# Patient Record
Sex: MALE | Race: WHITE | NOT HISPANIC OR LATINO | Employment: OTHER | URBAN - METROPOLITAN AREA
[De-identification: names, ages, dates, MRNs, and addresses within clinical notes are randomized per-mention and may not be internally consistent; named-entity substitution may affect disease eponyms.]

---

## 2017-06-21 ENCOUNTER — GENERIC CONVERSION - ENCOUNTER (OUTPATIENT)
Dept: OTHER | Facility: OTHER | Age: 82
End: 2017-06-21

## 2017-06-22 ENCOUNTER — GENERIC CONVERSION - ENCOUNTER (OUTPATIENT)
Dept: OTHER | Facility: OTHER | Age: 82
End: 2017-06-22

## 2017-06-30 ENCOUNTER — GENERIC CONVERSION - ENCOUNTER (OUTPATIENT)
Dept: OTHER | Facility: OTHER | Age: 82
End: 2017-06-30

## 2017-07-13 ENCOUNTER — GENERIC CONVERSION - ENCOUNTER (OUTPATIENT)
Dept: OTHER | Facility: OTHER | Age: 82
End: 2017-07-13

## 2017-07-31 ENCOUNTER — TRANSCRIBE ORDERS (OUTPATIENT)
Dept: ADMINISTRATIVE | Facility: HOSPITAL | Age: 82
End: 2017-07-31

## 2017-07-31 ENCOUNTER — ALLSCRIPTS OFFICE VISIT (OUTPATIENT)
Dept: OTHER | Facility: OTHER | Age: 82
End: 2017-07-31

## 2017-07-31 DIAGNOSIS — I47.1 PAROXYSMAL SUPRAVENTRICULAR TACHYCARDIA (HCC): ICD-10-CM

## 2017-07-31 DIAGNOSIS — I48.91 ATRIAL FIBRILLATION, UNSPECIFIED TYPE (HCC): Primary | ICD-10-CM

## 2017-07-31 DIAGNOSIS — I42.9 FAMILIAL CARDIOMYOPATHY (HCC): ICD-10-CM

## 2017-09-14 ENCOUNTER — GENERIC CONVERSION - ENCOUNTER (OUTPATIENT)
Dept: OTHER | Facility: OTHER | Age: 82
End: 2017-09-14

## 2018-01-14 NOTE — MISCELLANEOUS
Message  Called pt when checking charts for OV on 9/26 /Testing was cancelled for stress and echo   Lynita Ped  pt states had all testing done at [facility] and wanted to cancel OV to discuss results with Dr Dorita Loja Pt states was going to [address] [place] [address] cancelled for 9/26/17      Active Problems    1  Actinic keratosis (702 0) (L57 0)   2  Aortic valve disorder (424 1) (I35 9)   3  Atrial fibrillation (427 31) (I48 91)   4  Bilateral pleural effusion (511 9) (J90)   5  BPH without urinary obstruction (600 00) (N40 0)   6  Cardiomyopathy (425 4) (I42 9)   7  Chronic kidney disease (585 9) (N18 9)   8  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)   9  Diastolic heart failure (875 66) (I50 30)   10  Disorder of pericardium (423 9) (I31 9)   11  Essential hypertension (401 9) (I10)   12  GERD (gastroesophageal reflux disease) (530 81) (K21 9)   13  Hiatal hernia (553 3) (K44 9)   14  Hyperlipidemia (272 4) (E78 5)   15  Malaise and fatigue (780 79) (R53 81,R53 83)   16  Mitral valve disorder (394 9) (I05 9)   17  Mixed hyperlipidemia (272 2) (E78 2)   18  Paroxysmal SVT (supraventricular tachycardia) (427 0) (I47 1)   19  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)   20  Prostatitis (601 9) (N41 9)   21  Pulmonary heart disease (416 9) (I27 9)    Current Meds   1  Acetaminophen 325 MG Oral Tablet; TAKE 1 TO 2 TABLETS EVERY 4 HOURS AS   NEEDED; Therapy: (Recorded:[id number]) to Recorded   2  Allopurinol 100 MG Oral Tablet; TAKE 1 TABLET TWICE DAILY; Therapy: (Recorded:67Gdg6122) to Recorded   3  Colchicine 0 6 MG Oral Capsule; take 1 capsule daily; Therapy: (Recorded:35Ler7692) to Recorded   4  Klor-Con M20 20 MEQ Oral Tablet Extended Release; Take 1 tablet twice daily; Therapy: (Recorded:99Iiv1040) to Recorded   5  Lasix 80 MG Oral Tablet (Furosemide); take as directed; Therapy: (Recorded:55Kjs4193) to Recorded   6  Metoprolol Succinate ER 25 MG Oral Tablet Extended Release 24 Hour;  Take 1 tablet   daily; Therapy: (Recorded:67Hjf1693) to Recorded   7  Multi Vitamin Daily TABS; TAKE 1 TABLET DAILY; Therapy: (Recorded:74Uds3745) to Recorded   8  Nitrostat 0 4 MG Sublingual Tablet Sublingual (Nitroglycerin); TAKE AS DIRECTED; Therapy: (Recorded:95Igl4342) to Recorded   9  Pravastatin Sodium 40 MG Oral Tablet; TAKE 1 TABLET DAILY; Therapy: (Recorded:15Oia9960) to Recorded   10  Vitamin C 250 MG Oral Tablet; TAKE 1 TABLET DAILY; Therapy: (Recorded:75Ber0756) to Recorded   11  Vitamin D-400 TABS; Take 1 tablet daily; Therapy: (Recorded:26Ddc2764) to Recorded   12  Warfarin Sodium 2 5 MG Oral Tablet; TAKE 1 TABLET DAILY AS DIRECTED; Therapy: (Recorded:06Ehp6315) to Recorded   13  Zaroxolyn 2 5 MG TABS (MetOLazone); TAKE 1 TABLET DAILY; Therapy: (Recorded:76Ugz6555) to Recorded   14  Zetia 10 MG Oral Tablet (Ezetimibe); TAKE 1 TABLET DAILY; Therapy: (Recorded:77Dim0578) to Recorded    Allergies    1   Aleve TABS    Signatures   Electronically signed by : Choco Polanco, ; Sep 14 2017  1:24PM EST                       (Author)

## 2018-01-15 VITALS
DIASTOLIC BLOOD PRESSURE: 70 MMHG | HEIGHT: 64 IN | SYSTOLIC BLOOD PRESSURE: 118 MMHG | HEART RATE: 90 BPM | WEIGHT: 149.19 LBS | BODY MASS INDEX: 25.47 KG/M2